# Patient Record
(demographics unavailable — no encounter records)

---

## 2025-01-07 NOTE — REASON FOR VISIT
[Initial Consultation] : an initial consultation [Parents] : parents [Undescended testicle] : undescended testicle

## 2025-01-18 NOTE — CONSULT LETTER
[FreeTextEntry1] : Dear Dr. JEEVAN BANGURA,      I had the pleasure of consulting on TUAN MAO today.  Below is my note regarding the office visit today.      Thank you so very much for allowing me to participate in YAYAs care.  Please don't hesitate to call me should any questions or issues arise.        Sincerely,     Yasmani Bolanos MD, FACS, [facility]    Chief, Pediatric Urology    Professor of Urology and Pediatrics    [facility] [employer] [facility] [employer]        President, American Urological Association - [place] Section    Past-President, Societies for Pediatric Urology

## 2025-01-18 NOTE — HISTORY OF PRESENT ILLNESS
[TextBox_4] : TUAN presents today for an initial consultation.  [employer]  ID# [id number] (Mandarin) utilized as per mother request.  He is a 16 year old who was noted recently to have bilateral undescended testicles. This was NOT noted at birth. The testis has NOT been descending with time. No history of trauma or infection or inflammation. No pain or swelling on either side.  Of note, patient resides at [place], complex medical history, G-tube dependent.

## 2025-01-18 NOTE — PHYSICAL EXAM
[TextBox_92] : PENIS: Straight penis. Meatus orthotopic SCROTUM: Right testicle located in the external ring, left testicle located high in the scrotum; bilateral symmetric testes without palpable mass, hernia or hydrocele

## 2025-01-18 NOTE — CONSULT LETTER
[FreeTextEntry1] : Dear Dr. JEEVAN BANGURA,      I had the pleasure of consulting on TUAN MAO today.  Below is my note regarding the office visit today.      Thank you so very much for allowing me to participate in YAYAs care.  Please don't hesitate to call me should any questions or issues arise.        Sincerely,     Yasmani Bolanos MD, FACS, [facility]    Chief, Pediatric Urology    Professor of Urology and Pediatrics    [facility] [employer]        President, American Urological Association - [place] Section    Past-President, Societies for Pediatric Urology

## 2025-01-18 NOTE — HISTORY OF PRESENT ILLNESS
[TextBox_4] : TUAN presents today for an initial consultation.  [employer]  ID# [id number] (Mandarin) utilized as per mother request.  He is a 16 year old who was noted recently to have bilateral undescended testicles. This was NOT noted at birth. The testis has NOT been descending with time. No history of trauma or infection or inflammation. No pain or swelling on either side.  Of note, patient resides at [place], complex medical history, G-tube dependent.

## 2025-01-18 NOTE — ASSESSMENT
[FreeTextEntry1] : TUAN has an ascended RIGHT testis and an undescended LEFT testis. This occurs in the minority of boys with retractile testes. They do not descend and therefore require surgery to bring the testis down. I explained the general principles of the surgery using drawings, the anticipated postoperative course and discussed the possible risks which include but are not limited to infection, bleeding, testis atrophy or loss, hydrocele formation, incomplete positioning of the testis. All questions were answered.

## 2025-01-18 NOTE — HISTORY OF PRESENT ILLNESS
[TextBox_4] : TUAN presents today for an initial consultation.  [employer]  ID# [id number] (Mandarin) utilized as per mother request.  He is a 16 year old who was noted recently to have bilateral undescended testicles. This was NOT noted at birth. The testis has NOT been descending with time. No history of trauma or infection or inflammation. No pain or swelling on either side.  Of note, patient resides at [place], complex medical history, G-tube dependent.

## 2025-01-18 NOTE — CONSULT LETTER
[FreeTextEntry1] : Dear Dr. JEEVAN BANGURA,      I had the pleasure of consulting on TUAN MAO today.  Below is my note regarding the office visit today.      Thank you so very much for allowing me to participate in YAYAs care.  Please don't hesitate to call me should any questions or issues arise.        Sincerely,     Yasmani Bolanos MD, FACS, [facility]    Chief, Pediatric Urology    Professor of Urology and Pediatrics    [facility] [employer] [facility] [employer]        President, American Urological Association - [place] Section    Past-President, Societies for Pediatric Urology

## 2025-02-05 NOTE — HISTORY OF PRESENT ILLNESS
[FreeTextEntry1] : Martin is a 16 year male who is brought in today by a nurse from [place], where he resides, mother available on phone (mandarin speaking, nurse able to translate) for evaluation of scoliosis and right hip. 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 Mother feels with certain positions his back appears to cause his discomfort. Mother does not feel his hip appears to cause him any discomfort. Nurse from [place] denies that he appears to have any pain or discomfort. He presents today for orthopedic evaluation.

## 2025-02-05 NOTE — HISTORY OF PRESENT ILLNESS
[FreeTextEntry1] : Martin is a 16 year male who is brought in today by a nurse from [place], where he resides, mother available on phone (mandarin speaking, nurse able to translate) for evaluation of scoliosis and right hip. 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 Mother feels with certain positions his back appears to cause his discomfort. Mother does not feel his hip appears to cause him any discomfort. Nurse from [place] denies that he appears to have any pain or discomfort. He presents today for orthopedic evaluation.

## 2025-02-05 NOTE — ASSESSMENT
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   Today's visit included obtaining history from the child and parent due to the child's age, the child could not be considered a reliable historian, requiring [facility] to act as independent historian.   Natural history of spine deformity discussed. Clinical findings and imaging was discussed with the patient and family at length. X-rays performed and reviewed in office today revealing a 90 degree curve. It was discussed that for scoliosis, curves less than 25 degrees are usually managed with observation. Bracing is warranted for curves measuring greater than 25 degrees with skeletal growth remaining.  Braces do not correct curves permanently and there is a 30% risk brace failure. Surgery is recommended for scoliosis measuring greater than 45 degrees. Today curve measures 90 degrees. Surgery was discussed with Brusly's nurse and mother. We discussed that this a large curve. Risk and benefits of surgery was discussed. Given multiple medical comorbidities he would require optimization and preoperative evaluation by GI, pulmonary, and cardiology to minimize the risk of post operative complications. Possible post operative complications was discussed at length. Mother will present in person to next office visit for further discussion regarding moving forward with posterior spinal fusion. We also discussed that if his right hip does not appear to cause him any pain or discomfort, observation is recommended. Follow up recommended in 4 months for clinical reassessment and repeat XRs, sooner if mother is ready to proceed with surgical planning.    Risk of progression explained. Spine deformity can cause back pain later on and also arthritis, though usually later. Spine deformity can affect organ systems,such as lungs, less commonly heart and GI etc over time depending on curve size and progression. Deformity can progress with growth but can continue to progress later on based on the size of the curve. It can also effect patient's height due to the curve. It usually does not impact activities and has no limitations, however activities may be limited due to pain or rarely breathlessness with large curves. Scoliosis is usually not impacted by daily activities- sleeping position, sitting position, lifting heavy weights etc, however posture and back pain can be affected by some of these. Stretching, exercises, bone health and nutrition are important factors in the long run. The importance of core and back strengthening was discussed. Spine deformity may have genetics etiology and so siblings and progenies should be evaluated.   All questions and concerns were addressed today. Family verbalizes understanding and agree with plan of care.   We spent 45 minutes on HPI, Clinical exam, ordering/ reviewing all imaging, reviewing any existing record, reviewing findings and counseling patient to treatment, differentials, etiology, prognosis, natural history, implications on ADLs, activities limitations/modifications, answering questions and addressing concerns, treatment goals and documenting in the EHR.

## 2025-02-05 NOTE — PHYSICAL EXAM
[FreeTextEntry1] : Gait: Non ambulatory, presents in stretcher  GENERAL: appears in no apparent distress  SKIN: The skin is intact, warm, pink and dry over the area examined. CARDIOVASCULAR: brisk capillary refill, but no peripheral edema. RESPIRATORY: The patient is in no apparent respiratory distress. They're taking full deep breaths without use of accessory muscles or evidence of audible wheezes or stridor without the use of a stethoscope. Normal respiratory effort. ABDOMEN: not examined MSK: Significant joint contractures of the upper and lower extremities, patient is windswept to the left. No apparent discomfort with gentle ROM of the hips  Spine Significant left thoracolumbar curve  Motor and sensory exam limited due to underlying diagnosis

## 2025-02-05 NOTE — DATA REVIEWED
[de-identified] : Supine Scoliosis X-ray performed today independently reviewed demonstrates 90 degree left thorolumbar curve. No hemivertebrae or congenital deformity noted. The disc spaces are equal throughout spine. R hip is subluxed. Risser is difficult to assess based on todays x-rays

## 2025-02-05 NOTE — REASON FOR VISIT
[Initial Evaluation] : an initial evaluation [Patient] : patient [Mother] : mother [Other: _____] : [unfilled] [FreeTextEntry1] : Scoliosis evaluation

## 2025-02-05 NOTE — REVIEW OF SYSTEMS
[Appropriate Age Development] : development appropriate for age [Change in Activity] : no change in activity [Fever Above 102] : no fever [Itching] : no itching [Redness] : no redness [Sore Throat] : no sore throat [Murmur] : no murmur [Wheezing] : no wheezing [Asthma] : no asthma

## 2025-02-05 NOTE — DATA REVIEWED
[de-identified] : Supine Scoliosis X-ray performed today independently reviewed demonstrates 90 degree left thorolumbar curve. No hemivertebrae or congenital deformity noted. The disc spaces are equal throughout spine. R hip is subluxed. Risser is difficult to assess based on todays x-rays

## 2025-02-05 NOTE — ASSESSMENT
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   Today's visit included obtaining history from the child and parent due to the child's age, the child could not be considered a reliable historian, requiring [facility] to act as independent historian.   Natural history of spine deformity discussed. Clinical findings and imaging was discussed with the patient and family at length. X-rays performed and reviewed in office today revealing a 90 degree curve. It was discussed that for scoliosis, curves less than 25 degrees are usually managed with observation. Bracing is warranted for curves measuring greater than 25 degrees with skeletal growth remaining.  Braces do not correct curves permanently and there is a 30% risk brace failure. Surgery is recommended for scoliosis measuring greater than 45 degrees. Today curve measures 90 degrees. Surgery was discussed with Jennette's nurse and mother. We discussed that this a large curve. Risk and benefits of surgery was discussed. Given multiple medical comorbidities he would require optimization and preoperative evaluation by GI, pulmonary, and cardiology to minimize the risk of post operative complications. Possible post operative complications was discussed at length. Mother will present in person to next office visit for further discussion regarding moving forward with posterior spinal fusion. We also discussed that if his right hip does not appear to cause him any pain or discomfort, observation is recommended. Follow up recommended in 4 months for clinical reassessment and repeat XRs, sooner if mother is ready to proceed with surgical planning.    Risk of progression explained. Spine deformity can cause back pain later on and also arthritis, though usually later. Spine deformity can affect organ systems,such as lungs, less commonly heart and GI etc over time depending on curve size and progression. Deformity can progress with growth but can continue to progress later on based on the size of the curve. It can also effect patient's height due to the curve. It usually does not impact activities and has no limitations, however activities may be limited due to pain or rarely breathlessness with large curves. Scoliosis is usually not impacted by daily activities- sleeping position, sitting position, lifting heavy weights etc, however posture and back pain can be affected by some of these. Stretching, exercises, bone health and nutrition are important factors in the long run. The importance of core and back strengthening was discussed. Spine deformity may have genetics etiology and so siblings and progenies should be evaluated.   All questions and concerns were addressed today. Family verbalizes understanding and agree with plan of care.   We spent 45 minutes on HPI, Clinical exam, ordering/ reviewing all imaging, reviewing any existing record, reviewing findings and counseling patient to treatment, differentials, etiology, prognosis, natural history, implications on ADLs, activities limitations/modifications, answering questions and addressing concerns, treatment goals and documenting in the EHR.

## 2025-03-02 NOTE — PHYSICAL EXAM
[NL] : grossly intact [FreeTextEntry2] : Left posterior neck rounded, raised cystic lesion, central punctate opening, no fluctuance, no drainage, no induration [TextBox_37] : Right flank abscess with associated tenderness, I&D performed in office  [TextBox_86] : right posterior flank abscess, ~1.5cm, rounded, central fluctuance, apparent tenderness to palpation, no surrounding erythema or induration

## 2025-03-02 NOTE — REASON FOR VISIT
[Initial - Scheduled] : an initial, scheduled visit with concerns of [Abscess] : abscess [Mother] : mother [Other: _____] : [unfilled] [Foster Parents/Guardian] : /guardian [FreeTextEntry4] : Dr Pillo Palacio [Interpreters_IDNumber] : [id number] [Interpreters_FullName] : Dominick [TWNoteComboBox1] : Chinese

## 2025-03-02 NOTE — CONSULT LETTER
[Dear  ___] : Dear  [unfilled], [Consult Letter:] : I had the pleasure of evaluating your patient, [unfilled]. [Please see my note below.] : Please see my note below. [Consult Closing:] : Thank you very much for allowing me to participate in the care of this patient.  If you have any questions, please do not hesitate to contact me. [Sincerely,] : Sincerely, [FreeTextEntry3] : Sukhjinder Maciel MD Division of Pediatric, General, Thoracic and Endoscopic Surgery [facility]        [FreeTextEntry2] : Pillo Palacio MD [address] [place] [address] Phone: [phone]

## 2025-03-02 NOTE — ASSESSMENT
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 He currently residents at [facility] but per mom he has been having ongoing similar findings in the last year.  I spoke with mom and explained to her that the left neck lesion does not appear to be actively infected at this time and therefore I do not recommend any intervention at this time. However, there is a right flank abscess with associated flutuance and tenderness. I reviewed treatment options with mom on the phone using Mandarin , and I recommended incision and drainage of the abscess in the office today. I reviewed the indications, risks, benefits and alternatives of the procedure with mom. The risks discussed included but were not limited to bleeding, infection, injury to adjacent structures and recurrent abscess requiring another I&D. I reviewed post-procedural expectations. Mom verbalized understanding and gave consent to proceed.  A time out was performed. Emla cream was placed on the region. The area was prepped in sterile fashion.  An incision was made in the central most fluctuant aspect of the abscess. The cavity was probed into and purulent fluid as well as small fragmented cystic contents was expressed.  The area was irrigated and a sterile dressing was placed on the region. Martin tolerated the procedure well. I have recommended 5 day course of antibiotics to be given at [facility].  I have also recommended a dermatology consult given mom's concerns this has been an ongoing concern.  Martin can return to see me on an as needed basis.

## 2025-03-02 NOTE — ADDENDUM
[FreeTextEntry1] : Documented by Antoine Bai acting as a scribe for Dr. Maciel on 02/26/2025.   All medical record entries made by the Scribe were at my, Dr. Maciel, direction and personally dictated by me on 02/26/2025. I have reviewed the chart and agree that the record accurately reflects my personal performances of the history, physical exam, assessment and plan. I have also personally directed, reviewed, and agree with the instructions.

## 2025-03-02 NOTE — ASSESSMENT
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 He currently residents at [facility] but per mom he has been having ongoing similar findings in the last year.  I spoke with mom and explained to her that the left neck lesion does not appear to be actively infected at this time and therefore I do not recommend any intervention at this time. However, there is a right flank abscess with associated flutuance and tenderness. I reviewed treatment options with mom on the phone using Mandarin , and I recommended incision and drainage of the abscess in the office today. I reviewed the indications, risks, benefits and alternatives of the procedure with mom. The risks discussed included but were not limited to bleeding, infection, injury to adjacent structures and recurrent abscess requiring another I&D. I reviewed post-procedural expectations. Mom verbalized understanding and gave consent to proceed.  A time out was performed. Emla cream was placed on the region. The area was prepped in sterile fashion.  An incision was made in the central most fluctuant aspect of the abscess. The cavity was probed into and purulent fluid as well as small fragmented cystic contents was expressed.  The area was irrigated and a sterile dressing was placed on the region. Martin tolerated the procedure well. I have recommended 5 day course of antibiotics to be given at [facility].  I have also recommended a dermatology consult given mom's concerns this has been an ongoing concern.  Martin can return to see me on an as needed basis.

## 2025-03-02 NOTE — HISTORY OF PRESENT ILLNESS
[FreeTextEntry1] : Martin is a 16 year old boy with a complex medical history including encephalopathy, spastic quadraplegia, CP and epilepsy here today for surgical evaluation of a right flank abscess and left posterior neck cystic lesion.  He currently resides at [facility] and was sent in for evaluation of these two sites.  Per mom, this has been an ongoing issue and he continues to have skin lesions that seem to act up in the last year.  He has not had any fevers.

## 2025-03-02 NOTE — REASON FOR VISIT
[Initial - Scheduled] : an initial, scheduled visit with concerns of [Abscess] : abscess [Mother] : mother [Other: _____] : [unfilled] [Foster Parents/Guardian] : /guardian [FreeTextEntry4] : Dr Pillo Palacio [Interpreters_IDNumber] : [id number] [Interpreters_FullName] : Dominick [TWNoteComboBox1] : Chinese

## 2025-03-02 NOTE — HISTORY OF PRESENT ILLNESS
[FreeTextEntry1] : Martin is a 16 year old boy with a complex medical history including encephalopathy, spastic quadraplegia, CP and epilepsy here today for surgical evaluation of a right flank abscess and left posterior neck cystic lesion.  He currently resides at [facility] and was sent in for evaluation of these two sites.  Per mom, this has been an ongoing issue and he continues to have skin lesions that seem to act up in the last year.  He has not had any fevers.

## 2025-03-02 NOTE — CONSULT LETTER
[Dear  ___] : Dear  [unfilled], [Consult Letter:] : I had the pleasure of evaluating your patient, [unfilled]. [Please see my note below.] : Please see my note below. [Consult Closing:] : Thank you very much for allowing me to participate in the care of this patient.  If you have any questions, please do not hesitate to contact me. [Sincerely,] : Sincerely, [FreeTextEntry3] : Sukhjinder Maciel MD Division of Pediatric, General, Thoracic and Endoscopic Surgery [facility]        [FreeTextEntry2] : Pillo Palacio MD [address] [place] [address] Phone: [phone]

## 2025-03-06 NOTE — DATA REVIEWED
[de-identified] : No new imaging obtained during today's visit.   02/03/2025: Supine Scoliosis X-ray performed today independently reviewed demonstrates 90 degree left thorolumbar curve. No hemivertebrae or congenital deformity noted. The disc spaces are equal throughout spine. R hip is subluxed. Risser is difficult to assess based on todays x-rays

## 2025-03-06 NOTE — ASSESSMENT
[FreeTextEntry1] : 16-year-old male with neuromuscular scoliosis, curve measuring 90 degrees and subluxed right hip  Today's visit included obtaining history from the child and parent due to the child's age, the child could not be considered a reliable historian, requiring mother and [place]'s rep to act as independent historian. Cody Herrrea assisted with Mandarin interpretation for the entirety of the visit. Natural history of spine deformity discussed. Clinical findings and imaging was discussed with the patient and family at length. X-rays performed and reviewed in office today revealing a 90-degree curve. It was discussed that for scoliosis, curves less than 25 degrees are usually managed with observation. Bracing is warranted for curves measuring greater than 25 degrees with skeletal growth remaining.  Braces do not correct curves permanently and there is a 30% risk brace failure. Surgery is recommended for scoliosis measuring greater than 45 degrees. Today curve measures 90 degrees. Surgery was discussed with [place]'s nurse and mother. We discussed that this a large curve. Risk and benefits of surgery was discussed. Scoliosis is at risk of progression despite skeletal maturity due to its magnitude. We discussed that this a large curve. Risk and benefits of surgery was discussed. Given multiple medical comorbidities he would require optimization and preoperative evaluation by GI, neurology, pulmonary, and cardiology to minimize the risk of post operative complications. Possible post operative complications was discussed at length.  All risks, benefits, and alternatives were discussed in the clinic for a posterior spinal fusion with instrumentation procedure. Preoperative and postoperative instructions were reviewed with family. We will begin preoperative planning including pulmonary, neurology, cardiology, and GI appointments, as well as MRI of patient's spine for further evaluation. Mother is interested in proceeding with surgery as patient is having breathing and sleeping difficulties. Our  will contact family to begin scheduling their preoperative testing. We also discussed that if his right hip does not appear to cause him any pain or discomfort, observation is recommended. Activities as tolerated.  All questions answered, understanding verbalized. Parent and patient in agreement with plan of care. At follow up, patient to obtain scoliosis preoperative bending films for preoperative visit.   Natural history of spine deformity discussed. Risk of progression explained. Spine deformity can cause back pain later on and also arthritis, though usually later. Spine deformity can affect organ systems, such as lungs, less commonly heart and GI etc over time depending on curve size and progression. Deformity can progress with growth but can continue to progress later on based on the size of the curve. It can also effect patient's height due to the curve..It usually does not impact activities and has no limitations, however activities may be limited due to pain or rarely breathlessness with large curves. Scoliosis is usually not impacted by daily activities- sleeping position, sitting position, lifting heavy weights etc, however posture and back pain can be affected by some of these.Stretching, exercises, bone health and nutrition are important factors in the long run. Spine deformity may have genetics etiology and so siblings and progenies should be evaluated.For scoliosis, curves less than 25 degrees are usually managed with observation. Bracing is warranted for curves measuring greater than 25 degrees with skeletal growth remaining. Braces do not correct curves permanently and there is a 30% risk brace failure. Surgery is recommended for scoliosis measuring greater than 45 degrees.   Amarjit ARVIZU, have acted as a scribe and documented the above information for Dr. Adams on 03/03/2025.   We spent 30 minutes on HPI, Clinical exam, ordering/ reviewing all imaging, reviewing any existing record, reviewing findings and counseling patient to treatment, differentials,etiology, prognosis, natural history, implications on ADLs, activities limitations/modifications, genetics, answering questions and addressing concerns, treatment goals and documenting in the EHR.

## 2025-03-06 NOTE — ASSESSMENT
[FreeTextEntry1] : 16-year-old male with neuromuscular scoliosis, curve measuring 90 degrees and subluxed right hip  Today's visit included obtaining history from the child and parent due to the child's age, the child could not be considered a reliable historian, requiring mother and [place]'s rep to act as independent historian. Cody Herrera assisted with Mandarin interpretation for the entirety of the visit. Natural history of spine deformity discussed. Clinical findings and imaging was discussed with the patient and family at length. X-rays performed and reviewed in office today revealing a 90-degree curve. It was discussed that for scoliosis, curves less than 25 degrees are usually managed with observation. Bracing is warranted for curves measuring greater than 25 degrees with skeletal growth remaining.  Braces do not correct curves permanently and there is a 30% risk brace failure. Surgery is recommended for scoliosis measuring greater than 45 degrees. Today curve measures 90 degrees. Surgery was discussed with [place]'s nurse and mother. We discussed that this a large curve. Risk and benefits of surgery was discussed. Scoliosis is at risk of progression despite skeletal maturity due to its magnitude. We discussed that this a large curve. Risk and benefits of surgery was discussed. Given multiple medical comorbidities he would require optimization and preoperative evaluation by GI, neurology, pulmonary, and cardiology to minimize the risk of post operative complications. Possible post operative complications was discussed at length.  All risks, benefits, and alternatives were discussed in the clinic for a posterior spinal fusion with instrumentation procedure. Preoperative and postoperative instructions were reviewed with family. We will begin preoperative planning including pulmonary, neurology, cardiology, and GI appointments, as well as MRI of patient's spine for further evaluation. Mother is interested in proceeding with surgery as patient is having breathing and sleeping difficulties. Our  will contact family to begin scheduling their preoperative testing. We also discussed that if his right hip does not appear to cause him any pain or discomfort, observation is recommended. Activities as tolerated.  All questions answered, understanding verbalized. Parent and patient in agreement with plan of care. At follow up, patient to obtain scoliosis preoperative bending films for preoperative visit.   Natural history of spine deformity discussed. Risk of progression explained. Spine deformity can cause back pain later on and also arthritis, though usually later. Spine deformity can affect organ systems, such as lungs, less commonly heart and GI etc over time depending on curve size and progression. Deformity can progress with growth but can continue to progress later on based on the size of the curve. It can also effect patient's height due to the curve..It usually does not impact activities and has no limitations, however activities may be limited due to pain or rarely breathlessness with large curves. Scoliosis is usually not impacted by daily activities- sleeping position, sitting position, lifting heavy weights etc, however posture and back pain can be affected by some of these.Stretching, exercises, bone health and nutrition are important factors in the long run. Spine deformity may have genetics etiology and so siblings and progenies should be evaluated.For scoliosis, curves less than 25 degrees are usually managed with observation. Bracing is warranted for curves measuring greater than 25 degrees with skeletal growth remaining. Braces do not correct curves permanently and there is a 30% risk brace failure. Surgery is recommended for scoliosis measuring greater than 45 degrees.   Amarjit ARVIZU, have acted as a scribe and documented the above information for Dr. Adams on 03/03/2025.   We spent 30 minutes on HPI, Clinical exam, ordering/ reviewing all imaging, reviewing any existing record, reviewing findings and counseling patient to treatment, differentials,etiology, prognosis, natural history, implications on ADLs, activities limitations/modifications, genetics, answering questions and addressing concerns, treatment goals and documenting in the EHR.

## 2025-03-06 NOTE — HISTORY OF PRESENT ILLNESS
[FreeTextEntry1] : Martin is a 16-year-old male who is brought in today by mother and nurse from [place], where he resides, for follow up regarding scoliosis and right hip. 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 Patient presents today in a stretcher. Initially seen 02/03/2025, surgery was briefly discussed for 90-degree scoliosis. Mother is here today to discuss posterior spinal fusion in length. Per nurse, patient is requiring 2 L of oxygen as a result of his difficult breathing difficulties. Mother reports child has both breathing and sleeping difficulties. Mother feels with certain positions his back appears to cause his discomfort. Mother does not feel his hip appears to cause him any discomfort.  Nurse from [place] denies that he appears to have any pain or discomfort. Mother also reports child is having constipation issues. Mother is hesitant to undergo any Botox injections at this time. Patient last received Botox injection in January 2025. Here today to discuss surgery further.   JIA Herrera assisted with Mandarin interpretation.

## 2025-03-06 NOTE — HISTORY OF PRESENT ILLNESS
[FreeTextEntry1] : Martin is a 16-year-old male who is brought in today by mother and nurse from [place], where he resides, for follow up regarding scoliosis and right hip. 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 Patient presents today in a stretcher. Initially seen 02/03/2025, surgery was briefly discussed for 90-degree scoliosis. Mother is here today to discuss posterior spinal fusion in length. Per nurse, patient is requiring 2 L of oxygen as a result of his difficult breathing difficulties. Mother reports child has both breathing and sleeping difficulties. Mother feels with certain positions his back appears to cause his discomfort. Mother does not feel his hip appears to cause him any discomfort.  Nurse from [place] denies that he appears to have any pain or discomfort. Mother also reports child is having constipation issues. Mother is hesitant to undergo any Botox injections at this time. Patient last received Botox injection in January 2025. Here today to discuss surgery further.   JIA Herrera assisted with Mandarin interpretation.

## 2025-03-06 NOTE — REASON FOR VISIT
[Follow Up] : a follow up visit [Patient] : patient [Mother] : mother [Other: _____] : [unfilled] [FreeTextEntry1] : Scoliosis

## 2025-03-06 NOTE — DATA REVIEWED
[de-identified] : No new imaging obtained during today's visit.   02/03/2025: Supine Scoliosis X-ray performed today independently reviewed demonstrates 90 degree left thorolumbar curve. No hemivertebrae or congenital deformity noted. The disc spaces are equal throughout spine. R hip is subluxed. Risser is difficult to assess based on todays x-rays

## 2025-06-05 NOTE — PROCEDURE
[FreeTextEntry3] : BILATERAL ORCHIDOPEXY:   RIGHT (INGUINAL APPROACH)  LEFT (SCROTAL APPROACH) [FreeTextEntry5] : NONE [FreeTextEntry6] : BATHE IN 3 DAYS - SPONGE BATHS UNTIL THEN FEEDS AS USUAL FOLLOW UP 3 WEEKS

## 2025-06-05 NOTE — CONSULT LETTER
[FreeTextEntry1] :   Dear Dr. JEEVAN BANGURA,   Our mutual patient, TUAN MAO underwent surgery today as outlined below. The procedure went well and he was discharged from the PACU after an uneventful stay. Discharge instructions were provided in writing. Instructions regarding follow up were also provided.   Sincerely,   Yasmani Bolanos MD, FACS, FSPU Chief, Pediatric Urology Professor of Urology and Pediatrics [employer] [facility] [employer] at [facility].   President-elect, American Urological Association

## 2025-06-05 NOTE — CONSULT LETTER
[FreeTextEntry1] :   Dear Dr. JEEVAN BANGURA,   Our mutual patient, TUAN MAO underwent surgery today as outlined below. The procedure went well and he was discharged from the PACU after an uneventful stay. Discharge instructions were provided in writing. Instructions regarding follow up were also provided.   Sincerely,   Yasmani Bolanos MD, FACS, FSPU Chief, Pediatric Urology Professor of Urology and Pediatrics [facility] [employer] at [facility].   President-elect, American Urological Association

## 2025-07-03 NOTE — ASSESSMENT
[FreeTextEntry1] : 17yr old male here to receive medical clearance for his upcoming surgery for spinal fusion. He now lives at [facility] and is gastrostomy tube dependent. He is tolerating his feeds well. There are no complaints of reflux symptoms. He is stooling appropriately. His weight is stable. At this time, I have provided documentation that he is medical cleared for his surgery from a gastrointestinal perspective and encouraged him to be seen by other doctors for clearance for his surgery. I have recommended for Martin to follow up in 3-4months to reassess his clinical status and weight trend.

## 2025-07-03 NOTE — HISTORY OF PRESENT ILLNESS
[de-identified] : Martin is a 17 year old male here for further evaluation and treatment. He resides at [facility]. He was previously at [place] and was transferred in 9/2024. He is scheduled to have spinal surgery on 8/2 and is here for medical clearance for the procedure. Mom reports he was taking a shower in 2016 and suffered from anoxic brain injury and is now neurologically devastated. He is nonverbal, wheelchair bound and is G tube dependent. He has supplemental oxygen via nasal cannula. Mom reports prior to this incident, he was previously healthy.  He is accompanied by his mom and nurse at today's visit.  For his respiratory status, he performs chest clearance using a vest and suction as needed.  He does not take anything by mouth. He is G tube dependent which was placed 1 week after he was hospitalized in 2016.  He is receiving nestle compleat 280ml every 4 hours 1.0 (83% free water of this formula).  He receives free water 240ml after every feed  maintenance fluids based on his weight 2040ml Patient is receiving 1889ml per day, 25kcal/kg/day  There are no recent changes with his feeding schedule The doctor from [facility] has been handling his gtube feeds.   His weight has been stable. He maintains his weight. His last weight was 45kg at [facility] in June. The scale in the office at today's visit is broken.  He is tolerating his feeds well, there are no complaints of feeding intolerance or reflux symptoms. Mom reports he will only have reflux symptoms when sick. When he has reflux symptoms, mom reports the staff will provide a decreased amount of his feed or delay feedings which improves his symptoms.    There are no complaints of abdominal distension or discomfort with feeds. He is stooling once daily voiding well. His stools are soft in consistency. Mom reports the staff will administer red color juice and green bean soup to help him stool.   He suffered from a cold on June 20th and received antibiotics and cough medication. On 6/30, a CXR was performed which revealed no findings of PNA.  jaime button: 14Fr 3.0 cm skin good with no signs of redness, drainage. Mom reports cleaning the stoma site regularly.  PMH: found unresponsive in his tub in 2016, was taken to the hospital for further evaluation and was diagnosed with static encephalopathy, seizure disorder, G tube dependent, spastic quadriplegia, scoliosis PSH: G tube placement Medications: omeprazole 20mg BID, tums, vitamin D3, miralax 1capful twice daily, senna 8.6mg once daily, bisacodyl 10mg per rectum x1, fleet enema x1, glycerin supp x1 clonidine, dantrolene, diazepam, fish oil, flovent, glycopyrrolate, guaifenesin, levetiracetam, propranolol Allergy: none Family history: noncontributory Social: Patient lives at [facility]  Quinolones Counseling:  I discussed with the patient the risks of fluoroquinolones including but not limited to GI upset, allergic reaction, drug rash, diarrhea, dizziness, photosensitivity, yeast infections, liver function test abnormalities, tendonitis/tendon rupture.

## 2025-07-03 NOTE — ASSESSMENT
[FreeTextEntry1] : Martin has done well post-operatively his bilateral orchiopexy with right inguinal and left scrotal approach. Physical exam today demonstrated a well healed right inguinal incision, a well healed left scrotal incision and bilateral testes palpable in the dependent position with s/s of infection. Mother and I are quite satisfied with the outcome. Recommended follow up in 6-months for reexam. Mother expressed understanding and all questions were answered.

## 2025-07-03 NOTE — REASON FOR VISIT
[Follow-Up Visit] : a follow-up visit [PCP] : ~pcp~ [Mother] : mother [Other: _____] : [unfilled] [TextBox_50] : post-operative follow up [Interpreters_IDNumber] : [id number] [Interpreters_FullName] : Gabrielle Mcdaniels [FreeTextEntry3] : Mandarin [TWNoteComboBox1] : Other

## 2025-07-03 NOTE — PHYSICAL EXAM
[Well Developed] : well developed [Well Nourished] : well nourished [NAD] : in no acute distress [PERRL] : pupils were equal, round, reactive to light  [icteric] : anicteric [Moist & Pink Mucous Membranes] : moist and pink mucous membranes [CTAB] : lungs clear to auscultation bilaterally [Respiratory Distress] : no respiratory distress  [Regular Rate and Rhythm] : regular rate and rhythm [Normal S1, S2] : normal S1 and S2 [Soft] : soft  [Distended] : non distended [Tender] : non tender [Normal Bowel Sounds] : normal bowel sounds [Feeding Tube] : There was a feeding tube  [___F] : [unfilled] F [___cm] : [unfilled] cm [Clean] : clean [Dry] : dry [Erythema] : no erythema [Granulation Tissue] : no granulation tissue [Tube Rotates Easily] : tube rotates easily [No HSM] : no hepatosplenomegaly appreciated [Verbal] : non verbal [Well-Perfused] : well-perfused [Edema] : no edema [Cyanosis] : no cyanosis [Rash] : no rash [Jaundice] : no jaundice [Interactive] : interactive [FreeTextEntry1] : lying on stretcher [FreeTextEntry3] : nasal cannula in place [de-identified] : sciolosis [de-identified] : nonverbal, smiles

## 2025-07-03 NOTE — REASON FOR VISIT
[Follow-Up Visit] : a follow-up visit [PCP] : ~pcp~ [Mother] : mother [Other: _____] : [unfilled] [TextBox_50] : post-operative follow up [Interpreters_IDNumber] : [id number] [Interpreters_FullName] : Gabrielle Mcdaniels [FreeTextEntry3] : Mandarin [TWNoteComboBox1] : Other

## 2025-07-03 NOTE — PHYSICAL EXAM
[Well Developed] : well developed [Well Nourished] : well nourished [NAD] : in no acute distress [PERRL] : pupils were equal, round, reactive to light  [icteric] : anicteric [Moist & Pink Mucous Membranes] : moist and pink mucous membranes [CTAB] : lungs clear to auscultation bilaterally [Respiratory Distress] : no respiratory distress  [Regular Rate and Rhythm] : regular rate and rhythm [Normal S1, S2] : normal S1 and S2 [Soft] : soft  [Distended] : non distended [Tender] : non tender [Normal Bowel Sounds] : normal bowel sounds [Feeding Tube] : There was a feeding tube  [___F] : [unfilled] F [___cm] : [unfilled] cm [Clean] : clean [Dry] : dry [Erythema] : no erythema [Granulation Tissue] : no granulation tissue [Tube Rotates Easily] : tube rotates easily [No HSM] : no hepatosplenomegaly appreciated [Verbal] : non verbal [Well-Perfused] : well-perfused [Edema] : no edema [Cyanosis] : no cyanosis [Rash] : no rash [Jaundice] : no jaundice [Interactive] : interactive [FreeTextEntry1] : lying on stretcher [FreeTextEntry3] : nasal cannula in place [de-identified] : sciolosis [de-identified] : nonverbal, smiles

## 2025-07-03 NOTE — CONSULT LETTER
[FreeTextEntry1] : Dear Dr. Palacio,   I had the pleasure of seeing Martin for follow up today. Below is my note regarding the office visit today.    Thank you so very much for allowing me to participate in Martin's care. Please don't hesitate to call me should any questions or issues arise .    Sincerely,    Yasmani Bolanos MD, FACS, \[facility]\""U    Chief, Pediatric Urology    Professor of Urology and Pediatrics    [employer]    President, American Urological Association - [place] Section    Past-President, Societies for Pediatric Urology

## 2025-07-03 NOTE — REVIEW OF SYSTEMS
[Negative] : Skin [FreeTextEntry6] : negative except HPI [FreeTextEntry9] : negative except HPI [de-identified] : negative except HPI [de-identified] : negative except HPI

## 2025-07-03 NOTE — HISTORY OF PRESENT ILLNESS
[de-identified] : Martin is a 17 year old male here for further evaluation and treatment. He resides at [facility]. He was previously at [place] and was transferred in 9/2024. He is scheduled to have spinal surgery on 8/2 and is here for medical clearance for the procedure. Mom reports he was taking a shower in 2016 and suffered from anoxic brain injury and is now neurologically devastated. He is nonverbal, wheelchair bound and is G tube dependent. He has supplemental oxygen via nasal cannula. Mom reports prior to this incident, he was previously healthy.  He is accompanied by his mom and nurse at today's visit.  For his respiratory status, he performs chest clearance using a vest and suction as needed.  He does not take anything by mouth. He is G tube dependent which was placed 1 week after he was hospitalized in 2016.  He is receiving nestle compleat 280ml every 4 hours 1.0 (83% free water of this formula).  He receives free water 240ml after every feed  maintenance fluids based on his weight 2040ml Patient is receiving 1889ml per day, 25kcal/kg/day  There are no recent changes with his feeding schedule The doctor from [facility] has been handling his gtube feeds.   His weight has been stable. He maintains his weight. His last weight was 45kg at [facility] in June. The scale in the office at today's visit is broken.  He is tolerating his feeds well, there are no complaints of feeding intolerance or reflux symptoms. Mom reports he will only have reflux symptoms when sick. When he has reflux symptoms, mom reports the staff will provide a decreased amount of his feed or delay feedings which improves his symptoms.    There are no complaints of abdominal distension or discomfort with feeds. He is stooling once daily voiding well. His stools are soft in consistency. Mom reports the staff will administer red color juice and green bean soup to help him stool.   He suffered from a cold on June 20th and received antibiotics and cough medication. On 6/30, a CXR was performed which revealed no findings of PNA.  jaime button: 14Fr 3.0 cm skin good with no signs of redness, drainage. Mom reports cleaning the stoma site regularly.  PMH: found unresponsive in his tub in 2016, was taken to the hospital for further evaluation and was diagnosed with static encephalopathy, seizure disorder, G tube dependent, spastic quadriplegia, scoliosis PSH: G tube placement Medications: omeprazole 20mg BID, tums, vitamin D3, miralax 1capful twice daily, senna 8.6mg once daily, bisacodyl 10mg per rectum x1, fleet enema x1, glycerin supp x1 clonidine, dantrolene, diazepam, fish oil, flovent, glycopyrrolate, guaifenesin, levetiracetam, propranolol Allergy: none Family history: noncontributory Social: Patient lives at [facility]

## 2025-07-03 NOTE — HISTORY OF PRESENT ILLNESS
[TextBox_4] : Martin is a 17 y.o. male with a complex medical history being seen today for a post-operative follow up, s/p bilateral orchiopexy with right inguinal approach and left scrotal approach on 6/5/2025.He has remained afebrile post-operatively, tolerating GT feeds appropriately, voiding and stooling appropriately. Mother does not have any concerns. Patient scheduled for laminectomy this summer. No restrictions from a urological standpoint.

## 2025-07-03 NOTE — CONSULT LETTER
[FreeTextEntry1] : Dear Dr. Palacio,   I had the pleasure of seeing Martin for follow up today. Below is my note regarding the office visit today.    Thank you so very much for allowing me to participate in Martin's care. Please don't hesitate to call me should any questions or issues arise .    Sincerely,    Yasmani Bolanos MD, FACS, [facility]    Chief, Pediatric Urology    Professor of Urology and Pediatrics    [facility] [employer]    President, American Urological Association - [place] Section    Past-President, Societies for Pediatric Urology

## 2025-07-03 NOTE — ASSESSMENT
[FreeTextEntry1] : 17yr old male here to receive medical clearance for his upcoming surgery for spinal fusion. He now lives at [facility] and is gastrostomy tube dependent. He is tolerating his feeds well. There are no complaints of reflux symptoms. He is stooling appropriately. His weight is stable. At this time, I have provided documentation that he is medical cleared for his surgery from a gastrointestinal perspective and encouraged him to be seen by other doctors for clearance for his surgery. I have recommended for Martin to follow up in 3-4months to reassess his clinical status and weight trend.

## 2025-07-03 NOTE — ADDENDUM
[FreeTextEntry1] :  I have spent more than 45minutes reviewing records face to face time and charting for this patient.

## 2025-07-03 NOTE — REVIEW OF SYSTEMS
[Negative] : Skin [FreeTextEntry6] : negative except HPI [FreeTextEntry9] : negative except HPI [de-identified] : negative except HPI [de-identified] : negative except HPI

## 2025-07-03 NOTE — PHYSICAL EXAM
[TextBox_92] : Mother served as chaperone for exam.   PENIS: Uncircumcised. Distinct penoscrotal junction. Distinct penopubic junction.  No signs of infection.  TESTICLES: Bilateral testicles palpable in the dependent position of the scrotum, vertical lie, do no retract, without any masses, induration or tenderness, and approximately normal size, symmetric, and firm consistency. Left scrotal incision well healed.  SCROTAL/INGUINAL: No palpable inguinal hernias or hydroceles. Right Inguinal incision well-healed.
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
## 2025-07-13 NOTE — CONSULT LETTER
[Dear  ___] : Dear  [unfilled], [Consult Letter:] : I had the pleasure of evaluating your patient, [unfilled]. [Please see my note below.] : Please see my note below. [Consult Closing:] : Thank you very much for allowing me to participate in the care of this patient.  If you have any questions, please do not hesitate to contact me. [Sincerely,] : Sincerely, [FreeTextEntry3] : Riki Padron MD [facility]

## 2025-07-13 NOTE — PHYSICAL EXAM
[No Crackles] : no crackles [No Wheezing] : no wheezing [No Drainage] : no drainage [No Conjunctivitis] : no conjunctivitis [No Nasal Drainage] : no nasal drainage [Non-Erythematous] : non-erythematous [No Exudates] : no exudates [No Tonsillar Enlargement] : no tonsillar enlargement [No Stridor] : no stridor [Absence Of Retractions] : absence of retractions [Symmetric] : symmetric [Good Expansion] : good expansion [No Acc Muscle Use] : no accessory muscle use [Good aeration to bases] : good aeration to bases [Normal Sinus Rhythm] : normal sinus rhythm [No Heart Murmur] : no heart murmur [Soft, Non-Tender] : soft, non-tender [Non Distended] : was not ~L distended [No Clubbing] : no clubbing [Capillary Refill < 2 secs] : capillary refill less than two seconds [No Cyanosis] : no cyanosis [No Rashes] : no rashes [FreeTextEntry1] : Lying on stretcher, asleep [FreeTextEntry4] : loud snoring [FreeTextEntry3] : external exam normal [de-identified] : external exam normal [FreeTextEntry7] : frequent apneas, scattered rhonchi and referred upper airway sounds otherwise clear lungs [de-identified] : +contractures of extremities [de-identified] : hypertonic extremities, non-verbal, HIE

## 2025-07-13 NOTE — CONSULT LETTER
[Dear  ___] : Dear  [unfilled], [Consult Letter:] : I had the pleasure of evaluating your patient, [unfilled]. [Please see my note below.] : Please see my note below. [Consult Closing:] : Thank you very much for allowing me to participate in the care of this patient.  If you have any questions, please do not hesitate to contact me. [Sincerely,] : Sincerely, [FreeTextEntry3] : Riki Padron MD [facility]
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
## 2025-07-13 NOTE — PHYSICAL EXAM
[No Crackles] : no crackles [No Wheezing] : no wheezing [No Drainage] : no drainage [No Conjunctivitis] : no conjunctivitis [No Nasal Drainage] : no nasal drainage [Non-Erythematous] : non-erythematous [No Exudates] : no exudates [No Tonsillar Enlargement] : no tonsillar enlargement [No Stridor] : no stridor [Absence Of Retractions] : absence of retractions [Symmetric] : symmetric [Good Expansion] : good expansion [No Acc Muscle Use] : no accessory muscle use [Good aeration to bases] : good aeration to bases [Normal Sinus Rhythm] : normal sinus rhythm [No Heart Murmur] : no heart murmur [Soft, Non-Tender] : soft, non-tender [Non Distended] : was not ~L distended [No Clubbing] : no clubbing [Capillary Refill < 2 secs] : capillary refill less than two seconds [No Cyanosis] : no cyanosis [No Rashes] : no rashes [FreeTextEntry1] : Lying on stretcher, asleep [FreeTextEntry3] : external exam normal [FreeTextEntry4] : loud snoring [de-identified] : external exam normal [FreeTextEntry7] : frequent apneas, scattered rhonchi and referred upper airway sounds otherwise clear lungs [de-identified] : +contractures of extremities [de-identified] : hypertonic extremities, non-verbal, HIE

## 2025-07-13 NOTE — ASSESSMENT
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 Currently scheduled for surgery on 8/1. Patient resides at [facility] in [place], [place]. Additional medical history includes HIE, spastic quadriplegia, seizure disorder, hypertension, chronic respiratory failure, dysphagia, GERD, and GT dependence. He is non-verbal and non-ambulatory.  Martin has had a prior sleep study in 2023 notable for severe HUNG in addition to, reportedly, periods of wakeful apnea. He is currently supported with nocturnal BLPAP (settings in above HPI) and PRN supplemental oxygen via nasal cannula during the day. I spoke to Dr. Palacio from [place] who reports inconsistent periods of wakefulness and that often times, he is more asleep than awake. There have been prior conversations regarding tracheostomy which has been refused by family in the past. Additionally, family has been opposed to elective tracheostomy in the past. Throughout our visit, Martin was largely asleep, supported with a nasal cannula, with continued apneas and periods of gasping for air, very clearly obstructing. During my time with mom, I expressed my concerns that there is a very good chance that the ICU will have difficulties extubating Martin post-operatively and tracheostomy will need to be discussed. Mom was tearful as she feels torn between pursuit of correction of his scoliosis and the possibility for a trach. Tracheostomy aside, I expressed my concerns that Martin is currently under-supported. He likely would benefit from continuous BLPAP support at this time, especially given his inconsistent periods of wakefulness, at which time tracheostomy should be a consideration anyways.  On top of that, Martin is currently recovering from a viral illness (RVP +parainfluenza) and currently on antibiotics given concern for possible bacterial infection (?bronchitis). He is scheduled for a sedated MRI on 7/14. The timing of this procedure should be considered to provide Martin the best chance to recover well.  Will need to discuss further with [facility] team, anesthesia, and orthopedics before providing clearance.   Based on the above assessment, my recommendations are as follows: 1. Continue current BLPAP settings and recommend a nasal interface with chin strap to prevent air leak from his mouth or even a full facemask as he is monitored closely. Should be on BLPAP during all sleeping hours, not supplemental oxygen. 2. When well everyday: albuterol 2 puffs with spacer (or 2.5 mg/3 mL nebulization) followed by 3% sodium chloride via nebulization twice daily along with chest vest and cough assist device 2x daily 3. With respiratory illnesses: albuterol 2 puffs with spacer (or 2.5 mg/3 mL nebulization) followed by 3% sodium chloride via nebulization along with chest vest and cough assist device 3-4x daily, upwards of every 4 hours 4. Take Fluticasone Propionate  mcg/ACT 2 puffs twice daily with spacer 5. Will speak with [facility] team regarding optimization ahead of surgery.  Discussed above assessment and management plan. Parent agreed with plan. All queries were answered.

## 2025-07-13 NOTE — PHYSICAL EXAM
[No Crackles] : no crackles [No Wheezing] : no wheezing [No Drainage] : no drainage [No Conjunctivitis] : no conjunctivitis [No Nasal Drainage] : no nasal drainage [Non-Erythematous] : non-erythematous [No Exudates] : no exudates [No Tonsillar Enlargement] : no tonsillar enlargement [No Stridor] : no stridor [Absence Of Retractions] : absence of retractions [Symmetric] : symmetric [Good Expansion] : good expansion [No Acc Muscle Use] : no accessory muscle use [Good aeration to bases] : good aeration to bases [Normal Sinus Rhythm] : normal sinus rhythm [No Heart Murmur] : no heart murmur [Soft, Non-Tender] : soft, non-tender [Non Distended] : was not ~L distended [No Clubbing] : no clubbing [Capillary Refill < 2 secs] : capillary refill less than two seconds [No Cyanosis] : no cyanosis [No Rashes] : no rashes [FreeTextEntry1] : Lying on stretcher, asleep [FreeTextEntry3] : external exam normal [FreeTextEntry4] : loud snoring [de-identified] : external exam normal [FreeTextEntry7] : frequent apneas, scattered rhonchi and referred upper airway sounds otherwise clear lungs [de-identified] : +contractures of extremities [de-identified] : hypertonic extremities, non-verbal, HIE
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
## 2025-07-13 NOTE — PHYSICAL EXAM
[No Crackles] : no crackles [No Wheezing] : no wheezing [No Drainage] : no drainage [No Conjunctivitis] : no conjunctivitis [No Nasal Drainage] : no nasal drainage [Non-Erythematous] : non-erythematous [No Exudates] : no exudates [No Tonsillar Enlargement] : no tonsillar enlargement [No Stridor] : no stridor [Absence Of Retractions] : absence of retractions [Symmetric] : symmetric [Good Expansion] : good expansion [No Acc Muscle Use] : no accessory muscle use [Good aeration to bases] : good aeration to bases [Normal Sinus Rhythm] : normal sinus rhythm [No Heart Murmur] : no heart murmur [Soft, Non-Tender] : soft, non-tender [Non Distended] : was not ~L distended [No Clubbing] : no clubbing [Capillary Refill < 2 secs] : capillary refill less than two seconds [No Cyanosis] : no cyanosis [No Rashes] : no rashes [FreeTextEntry1] : Lying on stretcher, asleep [FreeTextEntry4] : loud snoring [FreeTextEntry3] : external exam normal [de-identified] : external exam normal [FreeTextEntry7] : frequent apneas, scattered rhonchi and referred upper airway sounds otherwise clear lungs [de-identified] : +contractures of extremities [de-identified] : hypertonic extremities, non-verbal, HIE

## 2025-07-13 NOTE — CONSULT LETTER
[Dear  ___] : Dear  [unfilled], [Consult Letter:] : I had the pleasure of evaluating your patient, [unfilled]. [Please see my note below.] : Please see my note below. [Consult Closing:] : Thank you very much for allowing me to participate in the care of this patient.  If you have any questions, please do not hesitate to contact me. [Sincerely,] : Sincerely, [FreeTextEntry3] : Riki Padron MD [facility]

## 2025-07-13 NOTE — ASSESSMENT
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 Currently scheduled for surgery on 8/1. Patient resides at [facility] in [place], [place]. Additional medical history includes HIE, spastic quadriplegia, seizure disorder, hypertension, chronic respiratory failure, dysphagia, GERD, and GT dependence. He is non-verbal and non-ambulatory.  Martin has had a prior sleep study in 2023 notable for severe HUNG in addition to, reportedly, periods of wakeful apnea. He is currently supported with nocturnal BLPAP (settings in above HPI) and PRN supplemental oxygen via nasal cannula during the day. I spoke to Dr. Palacio from [facility] who reports inconsistent periods of wakefulness and that often times, he is more asleep than awake. There have been prior conversations regarding tracheostomy which has been refused by family in the past. Additionally, family has been opposed to elective tracheostomy in the past. Throughout our visit, Martin was largely asleep, supported with a nasal cannula, with continued apneas and periods of gasping for air, very clearly obstructing. During my time with mom, I expressed my concerns that there is a very good chance that the ICU will have difficulties extubating Martin post-operatively and tracheostomy will need to be discussed. Mom was tearful as she feels torn between pursuit of correction of his scoliosis and the possibility for a trach. Tracheostomy aside, I expressed my concerns that Martin is currently under-supported. He likely would benefit from continuous BLPAP support at this time, especially given his inconsistent periods of wakefulness, at which time tracheostomy should be a consideration anyways.  On top of that, Martin is currently recovering from a viral illness (RVP +parainfluenza) and currently on antibiotics given concern for possible bacterial infection (?bronchitis). He is scheduled for a sedated MRI on 7/14. The timing of this procedure should be considered to provide Martin the best chance to recover well.  Will need to discuss further with [facility] team, anesthesia, and orthopedics before providing clearance.   Based on the above assessment, my recommendations are as follows: 1. Continue current BLPAP settings and recommend a nasal interface with chin strap to prevent air leak from his mouth or even a full facemask as he is monitored closely. Should be on BLPAP during all sleeping hours, not supplemental oxygen. 2. When well everyday: albuterol 2 puffs with spacer (or 2.5 mg/3 mL nebulization) followed by 3% sodium chloride via nebulization twice daily along with chest vest and cough assist device 2x daily 3. With respiratory illnesses: albuterol 2 puffs with spacer (or 2.5 mg/3 mL nebulization) followed by 3% sodium chloride via nebulization along with chest vest and cough assist device 3-4x daily, upwards of every 4 hours 4. Take Fluticasone Propionate  mcg/ACT 2 puffs twice daily with spacer 5. Will speak with [facility]'s team regarding optimization ahead of surgery.  Discussed above assessment and management plan. Parent agreed with plan. All queries were answered.

## 2025-07-13 NOTE — PHYSICAL EXAM
[No Crackles] : no crackles [No Wheezing] : no wheezing [No Drainage] : no drainage [No Conjunctivitis] : no conjunctivitis [No Nasal Drainage] : no nasal drainage [Non-Erythematous] : non-erythematous [No Exudates] : no exudates [No Tonsillar Enlargement] : no tonsillar enlargement [No Stridor] : no stridor [Absence Of Retractions] : absence of retractions [Symmetric] : symmetric [Good Expansion] : good expansion [No Acc Muscle Use] : no accessory muscle use [Good aeration to bases] : good aeration to bases [Normal Sinus Rhythm] : normal sinus rhythm [No Heart Murmur] : no heart murmur [Soft, Non-Tender] : soft, non-tender [Non Distended] : was not ~L distended [No Clubbing] : no clubbing [Capillary Refill < 2 secs] : capillary refill less than two seconds [No Cyanosis] : no cyanosis [No Rashes] : no rashes [FreeTextEntry1] : Lying on stretcher, asleep [FreeTextEntry3] : external exam normal [FreeTextEntry4] : loud snoring [de-identified] : external exam normal [FreeTextEntry7] : frequent apneas, scattered rhonchi and referred upper airway sounds otherwise clear lungs [de-identified] : +contractures of extremities [de-identified] : hypertonic extremities, non-verbal, HIE
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
## 2025-07-13 NOTE — CONSULT LETTER
[Dear  ___] : Dear  [unfilled], [Consult Letter:] : I had the pleasure of evaluating your patient, [unfilled]. [Please see my note below.] : Please see my note below. [Consult Closing:] : Thank you very much for allowing me to participate in the care of this patient.  If you have any questions, please do not hesitate to contact me. [Sincerely,] : Sincerely, [FreeTextEntry3] : Riki Padron MD [facility]

## 2025-07-13 NOTE — PHYSICAL EXAM
[No Crackles] : no crackles [No Wheezing] : no wheezing [No Drainage] : no drainage [No Conjunctivitis] : no conjunctivitis [No Nasal Drainage] : no nasal drainage [Non-Erythematous] : non-erythematous [No Exudates] : no exudates [No Tonsillar Enlargement] : no tonsillar enlargement [No Stridor] : no stridor [Absence Of Retractions] : absence of retractions [Symmetric] : symmetric [Good Expansion] : good expansion [No Acc Muscle Use] : no accessory muscle use [Good aeration to bases] : good aeration to bases [Normal Sinus Rhythm] : normal sinus rhythm [No Heart Murmur] : no heart murmur [Soft, Non-Tender] : soft, non-tender [Non Distended] : was not ~L distended [No Clubbing] : no clubbing [Capillary Refill < 2 secs] : capillary refill less than two seconds [No Cyanosis] : no cyanosis [No Rashes] : no rashes [FreeTextEntry1] : Lying on stretcher, asleep [FreeTextEntry4] : loud snoring [FreeTextEntry3] : external exam normal [de-identified] : external exam normal [FreeTextEntry7] : frequent apneas, scattered rhonchi and referred upper airway sounds otherwise clear lungs [de-identified] : +contractures of extremities [de-identified] : hypertonic extremities, non-verbal, HIE

## 2025-07-13 NOTE — ASSESSMENT
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 Currently scheduled for surgery on 8/1. Patient resides at [facility] in [place], [place]. Additional medical history includes HIE, spastic quadriplegia, seizure disorder, hypertension, chronic respiratory failure, dysphagia, GERD, and GT dependence. He is non-verbal and non-ambulatory.  Martin has had a prior sleep study in 2023 notable for severe HUNG in addition to, reportedly, periods of wakeful apnea. He is currently supported with nocturnal BLPAP (settings in above HPI) and PRN supplemental oxygen via nasal cannula during the day. I spoke to Dr. Palacio from [place] who reports inconsistent periods of wakefulness and that often times, he is more asleep than awake. There have been prior conversations regarding tracheostomy which has been refused by family in the past. Additionally, family has been opposed to elective tracheostomy in the past. Throughout our visit, Martin was largely asleep, supported with a nasal cannula, with continued apneas and periods of gasping for air, very clearly obstructing. During my time with mom, I expressed my concerns that there is a very good chance that the ICU will have difficulties extubating Martin post-operatively and tracheostomy will need to be discussed. Mom was tearful as she feels torn between pursuit of correction of his scoliosis and the possibility for a trach. Tracheostomy aside, I expressed my concerns that Martin is currently under-supported. He likely would benefit from continuous BLPAP support at this time, especially given his inconsistent periods of wakefulness, at which time tracheostomy should be a consideration anyways.  On top of that, Martin is currently recovering from a viral illness (RVP +parainfluenza) and currently on antibiotics given concern for possible bacterial infection (?bronchitis). He is scheduled for a sedated MRI on 7/14. The timing of this procedure should be considered to provide Martin the best chance to recover well.  Will need to discuss further with [facility] team, anesthesia, and orthopedics before providing clearance.   Based on the above assessment, my recommendations are as follows: 1. Continue current BLPAP settings and recommend a nasal interface with chin strap to prevent air leak from his mouth or even a full facemask as he is monitored closely. Should be on BLPAP during all sleeping hours, not supplemental oxygen. 2. When well everyday: albuterol 2 puffs with spacer (or 2.5 mg/3 mL nebulization) followed by 3% sodium chloride via nebulization twice daily along with chest vest and cough assist device 2x daily 3. With respiratory illnesses: albuterol 2 puffs with spacer (or 2.5 mg/3 mL nebulization) followed by 3% sodium chloride via nebulization along with chest vest and cough assist device 3-4x daily, upwards of every 4 hours 4. Take Fluticasone Propionate  mcg/ACT 2 puffs twice daily with spacer 5. Will speak with [facility]'s team regarding optimization ahead of surgery.  Discussed above assessment and management plan. Parent agreed with plan. All queries were answered.

## 2025-07-13 NOTE — CONSULT LETTER
[Dear  ___] : Dear  [unfilled], [Consult Letter:] : I had the pleasure of evaluating your patient, [unfilled]. [Please see my note below.] : Please see my note below. [Consult Closing:] : Thank you very much for allowing me to participate in the care of this patient.  If you have any questions, please do not hesitate to contact me. [Sincerely,] : Sincerely, [FreeTextEntry3] : Riki Padron MD [facility] Pul[facility]

## 2025-07-13 NOTE — ASSESSMENT
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 Currently scheduled for surgery on 8/1. Patient resides at [facility] in [place], [place]. Additional medical history includes HIE, spastic quadriplegia, seizure disorder, hypertension, chronic respiratory failure, dysphagia, GERD, and GT dependence. He is non-verbal and non-ambulatory.  Martin has had a prior sleep study in 2023 notable for severe HUNG in addition to, reportedly, periods of wakeful apnea. He is currently supported with nocturnal BLPAP (settings in above HPI) and PRN supplemental oxygen via nasal cannula during the day. I spoke to Dr. Palacio from [facility] [facility] who reports inconsistent periods of wakefulness and that often times, he is more asleep than awake. There have been prior conversations regarding tracheostomy which has been refused by family in the past. Additionally, family has been opposed to elective tracheostomy in the past. Throughout our visit, Martin was largely asleep, supported with a nasal cannula, with continued apneas and periods of gasping for air, very clearly obstructing. During my time with mom, I expressed my concerns that there is a very good chance that the ICU will have difficulties extubating Martin post-operatively and tracheostomy will need to be discussed. Mom was tearful as she feels torn between pursuit of correction of his scoliosis and the possibility for a trach. Tracheostomy aside, I expressed my concerns that Martin is currently under-supported. He likely would benefit from continuous BLPAP support at this time, especially given his inconsistent periods of wakefulness, at which time tracheostomy should be a consideration anyways.  On top of that, Martin is currently recovering from a viral illness (RVP +parainfluenza) and currently on antibiotics given concern for possible bacterial infection (?bronchitis). He is scheduled for a sedated MRI on 7/14. The timing of this procedure should be considered to provide Martin the best chance to recover well.  Will need to discuss further with [facility] team, anesthesia, and orthopedics before providing clearance.   Based on the above assessment, my recommendations are as follows: 1. Continue current BLPAP settings and recommend a nasal interface with chin strap to prevent air leak from his mouth or even a full facemask as he is monitored closely. Should be on BLPAP during all sleeping hours, not supplemental oxygen. 2. When well everyday: albuterol 2 puffs with spacer (or 2.5 mg/3 mL nebulization) followed by 3% sodium chloride via nebulization twice daily along with chest vest and cough assist device 2x daily 3. With respiratory illnesses: albuterol 2 puffs with spacer (or 2.5 mg/3 mL nebulization) followed by 3% sodium chloride via nebulization along with chest vest and cough assist device 3-4x daily, upwards of every 4 hours 4. Take Fluticasone Propionate  mcg/ACT 2 puffs twice daily with spacer 5. Will speak with [facility]'s team regarding optimization ahead of surgery.  Discussed above assessment and management plan. Parent agreed with plan. All queries were answered.

## 2025-07-13 NOTE — REVIEW OF SYSTEMS
[NI] : Allergic [Nl] : Hematologic/Lymphatic [Seizure] : seizures [Developmental Delay] : developmental delay [Failure To Thrive] : failure to thrive [FreeTextEntry2] : see HPI [FreeTextEntry4] : see HPI [FreeTextEntry7] : see HPI [FreeTextEntry6] : see HPI [FreeTextEntry8] : see HPI [FreeTextEntry9] : see HPI

## 2025-07-13 NOTE — REASON FOR VISIT
[Initial Consultation] : an initial consultation for [Mother] : mother [Other: _____] : [unfilled] [Language Line ] : provided by Language Line   [FreeTextEntry3] : Mandarin [TWNoteComboBox1] : Other

## 2025-07-13 NOTE — ASSESSMENT
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 Currently scheduled for surgery on 8/1. Patient resides at [facility] in [place], [place]. Additional medical history includes HIE, spastic quadriplegia, seizure disorder, hypertension, chronic respiratory failure, dysphagia, GERD, and GT dependence. He is non-verbal and non-ambulatory.  Martin has had a prior sleep study in 2023 notable for severe HUNG in addition to, reportedly, periods of wakeful apnea. He is currently supported with nocturnal BLPAP (settings in above HPI) and PRN supplemental oxygen via nasal cannula during the day. I spoke to Dr. Palacio from [place] who reports inconsistent periods of wakefulness and that often times, he is more asleep than awake. There have been prior conversations regarding tracheostomy which has been refused by family in the past. Additionally, family has been opposed to elective tracheostomy in the past. Throughout our visit, Martin was largely asleep, supported with a nasal cannula, with continued apneas and periods of gasping for air, very clearly obstructing. During my time with mom, I expressed my concerns that there is a very good chance that the ICU will have difficulties extubating Martin post-operatively and tracheostomy will need to be discussed. Mom was tearful as she feels torn between pursuit of correction of his scoliosis and the possibility for a trach. Tracheostomy aside, I expressed my concerns that Martin is currently under-supported. He likely would benefit from continuous BLPAP support at this time, especially given his inconsistent periods of wakefulness, at which time tracheostomy should be a consideration anyways.  On top of that, Martin is currently recovering from a viral illness (RVP +parainfluenza) and currently on antibiotics given concern for possible bacterial infection (?bronchitis). He is scheduled for a sedated MRI on 7/14. The timing of this procedure should be considered to provide Martin the best chance to recover well.  Will need to discuss further with [facility] team, anesthesia, and orthopedics before providing clearance.   Based on the above assessment, my recommendations are as follows: 1. Continue current BLPAP settings and recommend a nasal interface with chin strap to prevent air leak from his mouth or even a full facemask as he is monitored closely. Should be on BLPAP during all sleeping hours, not supplemental oxygen. 2. When well everyday: albuterol 2 puffs with spacer (or 2.5 mg/3 mL nebulization) followed by 3% sodium chloride via nebulization twice daily along with chest vest and cough assist device 2x daily 3. With respiratory illnesses: albuterol 2 puffs with spacer (or 2.5 mg/3 mL nebulization) followed by 3% sodium chloride via nebulization along with chest vest and cough assist device 3-4x daily, upwards of every 4 hours 4. Take Fluticasone Propionate  mcg/ACT 2 puffs twice daily with spacer 5. Will speak with [facility]'s team regarding optimization ahead of surgery.  Discussed above assessment and management plan. Parent agreed with plan. All queries were answered.
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
## 2025-07-13 NOTE — CONSULT LETTER
[Dear  ___] : Dear  [unfilled], [Consult Letter:] : I had the pleasure of evaluating your patient, [unfilled]. [Please see my note below.] : Please see my note below. [Consult Closing:] : Thank you very much for allowing me to participate in the care of this patient.  If you have any questions, please do not hesitate to contact me. [Sincerely,] : Sincerely, [FreeTextEntry3] : Riki Padron MD [facility] Pulmonary [facility]rosis [facility]

## 2025-07-13 NOTE — ASSESSMENT
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 Currently scheduled for surgery on 8/1. Patient resides at [facility] in [place], [place]. Additional medical history includes HIE, spastic quadriplegia, seizure disorder, hypertension, chronic respiratory failure, dysphagia, GERD, and GT dependence. He is non-verbal and non-ambulatory.  Martin has had a prior sleep study in 2023 notable for severe HUNG in addition to, reportedly, periods of wakeful apnea. He is currently supported with nocturnal BLPAP (settings in above HPI) and PRN supplemental oxygen via nasal cannula during the day. I spoke to Dr. Palacio from [place] who reports inconsistent periods of wakefulness and that often times, he is more asleep than awake. There have been prior conversations regarding tracheostomy which has been refused by family in the past. Additionally, family has been opposed to elective tracheostomy in the past. Throughout our visit, Martin was largely asleep, supported with a nasal cannula, with continued apneas and periods of gasping for air, very clearly obstructing. During my time with mom, I expressed my concerns that there is a very good chance that the ICU will have difficulties extubating Martin post-operatively and tracheostomy will need to be discussed. Mom was tearful as she feels torn between pursuit of correction of his scoliosis and the possibility for a trach. Tracheostomy aside, I expressed my concerns that Martin is currently under-supported. He likely would benefit from continuous BLPAP support at this time, especially given his inconsistent periods of wakefulness, at which time tracheostomy should be a consideration anyways.  On top of that, Martin is currently recovering from a viral illness (RVP +parainfluenza) and currently on antibiotics given concern for possible bacterial infection (?bronchitis). He is scheduled for a sedated MRI on 7/14. The timing of this procedure should be considered to provide Martin the best chance to recover well.  Will need to discuss further with [facility] team, anesthesia, and orthopedics before providing clearance.   Based on the above assessment, my recommendations are as follows: 1. Continue current BLPAP settings and recommend a nasal interface with chin strap to prevent air leak from his mouth or even a full facemask as he is monitored closely. Should be on BLPAP during all sleeping hours, not supplemental oxygen. 2. When well everyday: albuterol 2 puffs with spacer (or 2.5 mg/3 mL nebulization) followed by 3% sodium chloride via nebulization twice daily along with chest vest and cough assist device 2x daily 3. With respiratory illnesses: albuterol 2 puffs with spacer (or 2.5 mg/3 mL nebulization) followed by 3% sodium chloride via nebulization along with chest vest and cough assist device 3-4x daily, upwards of every 4 hours 4. Take Fluticasone Propionate  mcg/ACT 2 puffs twice daily with spacer 5. Will speak with [facility]'s team regarding optimization ahead of surgery.  Discussed above assessment and management plan. Parent agreed with plan. All queries were answered.

## 2025-07-13 NOTE — REVIEW OF SYSTEMS
[NI] : Allergic [Nl] : Hematologic/Lymphatic [Seizure] : seizures [Developmental Delay] : developmental delay [Failure To Thrive] : failure to thrive [FreeTextEntry2] : see HPI [FreeTextEntry4] : see HPI [FreeTextEntry6] : see HPI [FreeTextEntry7] : see HPI [FreeTextEntry8] : see HPI [FreeTextEntry9] : see HPI
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
## 2025-07-21 NOTE — REASON FOR VISIT
[Initial Consultation] : an initial consultation for [Presurgical Evaluation] : presurgical evaluation [Mother] : mother [Language Line ] : provided by Language Line   [Interpreters_IDNumber] : [id number] [Interpreters_FullName] : Selena [FreeTextEntry3] : Mandarin [TWNoteComboBox1] : Other

## 2025-07-21 NOTE — CARDIOLOGY SUMMARY
[Today's Date] : [unfilled] [FreeTextEntry1] : Normal sinus rhythm, HR 66bpm, normal axes and intervals, normal ECG [FreeTextEntry2] : Summary: 1. Technically limited study due to poor acoustic windows, patient positioning, and lung artifact. 2. Normal left ventricular size, morphology and systolic function. 3. Normal right ventricular morphology with qualitatively normal size and systolic function. 4. The tricuspid regurgitant jet, as recorded, is inadequate for the purpose of estimating right ventricular systolic pressure. 5. No pericardial effusion.

## 2025-07-21 NOTE — CLEARANCE
[Cleared] : ~He/She~ is cleared for the surgical procedure [Does not require] : ~He/She~ does not require SBE prophylaxis [Requires] : ~He/She~ will require cardiac monitoring throughout the procedure and during the recovery period as per routine protocol [Does not require] : ~He/She~ does not require anesthesia by nor in consultation with a Pediatric Cardiac Anesthesiologist

## 2025-07-21 NOTE — PHYSICAL EXAM
[General Appearance - Well Nourished] : well nourished [Sclera] : the conjunctiva were normal [Outer Ear] : the ears and nose were normal in appearance [Examination Of The Oral Cavity] : mucous membranes were moist and pink [Respiration, Rhythm And Depth] : normal respiratory rhythm and effort [Clear Bilaterally] : the lungs were clear to auscultation bilaterally [Rales / Crackles Bilateral] : no rales or crackles were heard [Normal Chest Appearance] : the chest was normal in appearance [Chest Visual Inspection Thoracic Deformity] : no chest wall deformity [Heart Rate And Rhythm] : normal heart rate and rhythm [Apical Impulse] : quiet precordium with normal apical impulse [Heart Sounds] : normal S1 and S2 [No Murmur] : no murmurs  [Heart Sounds Gallop] : no gallops [Heart Sounds Pericardial Friction Rub] : no pericardial rub [Edema] : no edema [Arterial Pulses] : normal upper and lower extremity pulses with no pulse delay [Bowel Sounds] : normal bowel sounds [Abdomen Soft] : soft [] : no hepato-splenomegaly [Nail Clubbing] : no clubbing  or cyanosis of the fingers [FreeTextEntry1] : lying in stretcher, sleeping [FreeTextEntry5] : cough intermittently

## 2025-07-21 NOTE — CONSULT LETTER
[Today's Date] : [unfilled] [Name] : Name: [unfilled] [] : : ~~ [Today's Date:] : [unfilled] [Dear  ___:] : Dear Dr. [unfilled]: [Consult - Single Provider] : Thank you very much for allowing me to participate in the care of this patient. If you have any questions, please do not hesitate to contact me. [Sincerely,] : Sincerely, [FreeTextEntry4] : Pillo Palacio MD [FreeTextEntry5] : [address] [place] [id number] [de-identified] : Yahaira Navarro MD, FRCPC, FAAP Pediatric Cardiologist [facility] [email or web address]

## 2025-07-22 NOTE — ASSESSMENT
[FreeTextEntry1] : Martin is a 17-year-old male with spastic cerebral palsy, epilepsy, and contractures presenting as a follow up for spasticity management. During the prior visit, tried to defer Botox injections until after his spinal surgery in August, but the mother can communicate with Martin and spasticity driven pain is significant, and the mother does not want to delay further care. Today he underwent alcohol neurolysis and ultrasound-guided botulinum toxin injections. Mother and rehab RN caregiver were present for the visit. His overall medical complexity necessitates careful consideration of any additional procedures or interventions prior to the spinal surgery.  PLAN: - Will email [facility] NP to set up video visit to monitor symptoms - Will monitor this patient post-operatively after spine surgery.   attending addendum defered chemodenervation to hamstring and left hip flexor and left hip obturator

## 2025-07-22 NOTE — REVIEW OF SYSTEMS
[Joint Stiffness] : joint stiffness [Fever] : no fever [Red Eyes] : eyes not red [Chest Pain] : no chest pain [Swollen Glands] : no swollen glands [FreeTextEntry6] : On supplemental O2 via NC [FreeTextEntry7] : G tube in place.  [FreeTextEntry8] : Wearing diaper [de-identified] : No rashes visualized. [de-identified] : Positive for spastic quadriplegia

## 2025-07-22 NOTE — REVIEW OF SYSTEMS
[Joint Stiffness] : joint stiffness [Fever] : no fever [Red Eyes] : eyes not red [Chest Pain] : no chest pain [Swollen Glands] : no swollen glands [FreeTextEntry6] : On supplemental O2 via NC [FreeTextEntry7] : G tube in place.  [FreeTextEntry8] : Wearing diaper [de-identified] : No rashes visualized. [de-identified] : Positive for spastic quadriplegia

## 2025-07-22 NOTE — PROCEDURE
[Total Units: ___] : [unfilled] units [Consent] : consent was given by patient or guardian [Site Verification] : the injection site was verified [Post-Injection Instructions Provided] : post-injection instructions were provided [Other ___] : [unfilled] [Total Vials: ___] : [unfilled] vials were used [Total Waste: ___] : with [unfilled] wasted [] : Nerve stimulation was used during the procedure [de-identified] : Alcohol neurolysis was performed.  Description: 4 cc's of normal saline and 5 cc's of 95% abalysinol were utilized. per 9ml syringe solution and I used total 9ml 3 syringes total. Used 9 mL per each nerve level: - R Musculocutaneous nerve total 9ml - L Musculocutaneous nerve total 9ml - R Obturator Nerve (divided into 7 mL Anterior branch of obturator and 2 mL posterior branch).   I used ultrasound and estim and I injected the until physiological twitch dissipated and  Additionally, performed botulinum toxin injections to: [de-identified] : Pectoralis Major [de-identified] : 50 Units [TWNoteComboBox1] : Flexor Carpi Radialis [TWNoteComboBox2] : 75 Units [de-identified] : Flexor Carpi Ulnaris [de-identified] : 25 Units [de-identified] : Flexor Digitorum Superficialis [de-identified] : 50 Units [de-identified] : Flexor Carpi Radialis [de-identified] : 50 Units [de-identified] : Flexor Carpi Ulnaris [de-identified] : 50 Units [de-identified] : Flexor Digitorum Superficialis [de-identified] : 50 Units [de-identified] : Latissimus Dorsi [de-identified] : 50 Units [de-identified] : Flexor Digitorum Profundus [de-identified] : 25 Units [de-identified] : 25 Units [de-identified] : 25 Units

## 2025-07-22 NOTE — PHYSICAL EXAM
[FreeTextEntry1] : HYSICAL EXAMINATION: General appearance - dystonic posture. Appears comfortable at rest, although intermittently crying during injections. Consolable.  Mental status - Non verbal, does not follow commands at baseline due to spasticity Respiratory - no wheezing heard CHEST: equal expansion upon breathing in Abdomen - G tube in place Skin - Warm and dry.  Neurological - Baseline, non-verbal, spastic quadriplegia  Coordination & Balance: no sitting balance  Musculoskeletal - right hip internally rotated and can palpate Greater trochanter on Right left knee can be extended fully Right knee can be extended upto 30 degree knee angle 30 degree Left elbow can be extended upto 130 Right elbow can be extended upto 130 spine curve significant.

## 2025-07-22 NOTE — PROCEDURE
[Total Units: ___] : [unfilled] units [Consent] : consent was given by patient or guardian [Site Verification] : the injection site was verified [Post-Injection Instructions Provided] : post-injection instructions were provided [Other ___] : [unfilled] [Total Vials: ___] : [unfilled] vials were used [Total Waste: ___] : with [unfilled] wasted [] : Nerve stimulation was used during the procedure [de-identified] : Alcohol neurolysis was performed.  Description: 4 cc's of normal saline and 5 cc's of 95% abalysinol were utilized. per 9ml syringe solution and I used total 9ml 3 syringes total. Used 9 mL per each nerve level: - R Musculocutaneous nerve total 9ml - L Musculocutaneous nerve total 9ml - R Obturator Nerve (divided into 7 mL Anterior branch of obturator and 2 mL posterior branch).   I used ultrasound and estim and I injected the until physiological twitch dissipated and  Additionally, performed botulinum toxin injections to: [de-identified] : Pectoralis Major [de-identified] : 50 Units [TWNoteComboBox1] : Flexor Carpi Radialis [TWNoteComboBox2] : 75 Units [de-identified] : Flexor Carpi Ulnaris [de-identified] : 25 Units [de-identified] : Flexor Digitorum Superficialis [de-identified] : 50 Units [de-identified] : Flexor Carpi Radialis [de-identified] : 50 Units [de-identified] : Flexor Carpi Ulnaris [de-identified] : 50 Units [de-identified] : Flexor Digitorum Superficialis [de-identified] : 50 Units [de-identified] : Latissimus Dorsi [de-identified] : 50 Units [de-identified] : Flexor Digitorum Profundus [de-identified] : 25 Units [de-identified] : 25 Units [de-identified] : 25 Units

## 2025-07-22 NOTE — HISTORY OF PRESENT ILLNESS
[FreeTextEntry1] :   This note was created using Dragon Voice Recognition Software and reviewed to the best of my ability. Sporadic inaccurate translation may have occurred. Please forgive any typographical or grammatical errors, and please contact me to clarify discrepancies or to verify content.  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 He is scheduled for spinal surgery on August 1st. Current medications include propranolol, Keppra, glycopyrrolate, dantrolene, and diazepam. He receives G-tube feedings and supplemental oxygen. He is experiencing increased muscle tightness, particularly in his spine, hips, and legs. He has a history of Botox injections for spasticity management, performed by Dr. Feldman, but his mother reports diminishing returns and increasing tightness despite these treatments. He has an elbow contracture on the right side (125 degrees of extension), right hip dislocation, and stiff right elbow. His right knee can be fully extended, while the left knee extends 30 degrees beyond full extension.   so overall becoming opsoclonus position with tightening upper extremities  Puring the prior visit, tried to defer Botox injections until after his spinal surgery in August, but the mother can communicate with Inocencio and spasticity driven pain is significant, and the mother does not want to delay further care. Today we are performing alcohol neurolysis and ultrasound-guided botulinum toxin injections.  i had detailed discussions before chemodenervation The mother is worried about right tight shoulder bilatearl elbow tightness and wrist flexion contractures  SInce alcohol neurolysis takes effects immediately I allowed the mother to check how much she was satisfied with the alcohol chemoneurolysis Current Functional Status: Dependent in all ADLs  Current Functional Status: dependent in ADL             
[FreeTextEntry1] :   This note was created using Dragon Voice Recognition Software and reviewed to the best of my ability. Sporadic inaccurate translation may have occurred. Please forgive any typographical or grammatical errors, and please contact me to clarify discrepancies or to verify content.  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 He is scheduled for spinal surgery on August 1st. Current medications include propranolol, Keppra, glycopyrrolate, dantrolene, and diazepam. He receives G-tube feedings and supplemental oxygen. He is experiencing increased muscle tightness, particularly in his spine, hips, and legs. He has a history of Botox injections for spasticity management, performed by Dr. Feldman, but his mother reports diminishing returns and increasing tightness despite these treatments. He has an elbow contracture on the right side (125 degrees of extension), right hip dislocation, and stiff right elbow. His right knee can be fully extended, while the left knee extends 30 degrees beyond full extension.   so overall becoming opsoclonus position with tightening upper extremities  Puring the prior visit, tried to defer Botox injections until after his spinal surgery in August, but the mother can communicate with Inocencio and spasticity driven pain is significant, and the mother does not want to delay further care. Today we are performing alcohol neurolysis and ultrasound-guided botulinum toxin injections.  i had detailed discussions before chemodenervation The mother is worried about right tight shoulder bilatearl elbow tightness and wrist flexion contractures  SInce alcohol neurolysis takes effects immediately I allowed the mother to check how much she was satisfied with the alcohol chemoneurolysis Current Functional Status: Dependent in all ADLs  Current Functional Status: dependent in ADL             
normal...

## 2025-07-24 NOTE — DATA REVIEWED
[de-identified] : 7/24/25:  Preoperative bending XRs Scoliosis X-ray performed today independently reviewed demonstrates 93 degree left thorolumbar curve. No hemivertebrae or congenital deformity noted. The disc spaces are equal throughout spine. R hip is subluxed. Risser is difficult to assess based on todays x-rays

## 2025-07-24 NOTE — PHYSICAL EXAM
[facility]    [address]    [place] [address]    Phone:  [phone]    Fax:  [phone]       Thank You for choosing us for your health care visit. We are glad to serve you and happy to provide you with this summary of your visit. Please help us to ensure we have accurate records. If you find anything that needs to be changed, please let our staff know as soon as possible.          Your Demographic Information     Patient Name Sex Piper Quezada Female 1990       Ethnic Group Patient Race    Not of  or  Origin White      Your Visit Details     Date & Time Provider Department    4/3/2017 11:30 AM BELEN Fuller [facility]      Your Vitals Were     BP Pulse Height Weight BMI Smoking Status    96/60 100 5' 9.5\" (1.765 m) 254 lb (115.2 kg) 36.97 kg/m2 Current Every Day Smoker      Medications Prescribed or Re-Ordered Today     None      Your Current Medications Are        Disp Refills Start End    Multiple Vitamins-Minerals (MULTIVITAMIN ADULT PO)        Class: Historical Med    Route: Oral      Discontinued Medications        Reason for Discontinue    azithromycin (ZITHROMAX) 250 MG tablet Therapy Completed      Allergies     Ibuprofen VOMITING      Immunizations History as of 4/3/2017     Name Date    DTaP 1996, 3/3/1992, 1991, 1991, 1991    HIB 3/3/1992, 1991, 1991    HPV QUAD 5/15/2008, 2008, 2007    Hepatitis B Child 2001, 2001, 3/16/1999, 1998, 1996    MMR 1996, 3/3/1992    Polio, ORAL 1996, 3/3/1992, 1991, 1991    Tdap 2007      Problem List as of 4/3/2017     Tobacco use disorder              Patient Instructions      Thank you for allowing us to care for you today!     I hope that you have had your questions answered and you understand any plans that have been discussed. If you did not, or if you have more  questions, please call us or send us a message via tinyclues at your convenience. Our phone numbers are listed below and directions on how to sign up for tinyclues are also listed below.     Please consider signing up for tinyclues. Please visit [email or web address] to sign up, it takes less than five minutes. This will allow you to set up a free online resource for quick and easy access to personal health information that allows you to view your own labs, view image results, send messages to nursing as well as myself, request medication refills, schedule appointments, pay bills and more.    Mailed Survey:  Shortly after your visit, you may receive a survey in the mail. We kindly ask that you take the time to fill it out. These surveys are a great way to help us improve and our clinic is graded on these results! We appreciate your feedback and strive to provide you with the best care possible.     Preventative Health Care:   Your health is very important to us and we high recommend preventive health care services. Preventative health care can include lab tests, imaging, and vaccines. Please review your recommendations below and contact us with further questions, concerns, and for assistance scheduling these items.     Health Maintenance Summary     Topic Due On Due Status Completed On    IMMUNIZATION - HPV  Completed May 15, 2008    PAP SMEAR - CERVICAL CANCER SCREENING Aug 1, 2019 Not Due Aug 1, 2016    Immunization - TDAP Pregnancy  Hidden     IMMUNIZATION - DTaP/Tdap/Td Nov 14, 2017 Not Due Nov 14, 2007    Immunization-Influenza Sep 1, 2017 Not Due               Thank you,    BELEN Torres  [facility]  Office:  [phone]  Nurse:  [phone]  Fax: [phone]                        [FreeTextEntry1] : Gait: Non ambulatory, presents in stretcher  GENERAL: appears in no apparent distress  SKIN: The skin is intact, warm, pink and dry over the area examined. CARDIOVASCULAR: brisk capillary refill, but no peripheral edema. RESPIRATORY: The patient is in no apparent respiratory distress. They're taking full deep breaths without use of accessory muscles or evidence of audible wheezes or stridor without the use of a stethoscope. Normal respiratory effort. ABDOMEN: not examined MSK: Significant joint contractures of the upper and lower extremities, patient is windswept to the left. No apparent discomfort with gentle ROM of the hips  Spine Significant left thoracolumbar curve  Motor and sensory exam limited due to underlying diagnosis

## 2025-07-24 NOTE — HISTORY OF PRESENT ILLNESS
[FreeTextEntry1] : Martin is a 17-year-old male who is brought in today by mother and nurse from [place], where he resides, for follow up regarding scoliosis and right hip. 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 Patient presents today in a stretcher. Initially seen 02/03/2025, surgery was briefly discussed for 90-degree scoliosis. He is now scheduled for posterior spinal fusion with insturmentation on 8/1/2025. He has been evaluated by cardiology, pulmonology, and has an appointment with neurology tomorrow for clearance for surgery. He obtained medical clearance for surgery through [facility] medial team. Per nurse, patient is requiring 2 L of oxygen as a result of his difficult breathing difficulties. Mother reports child has both breathing and sleeping difficulties. Mother feels with certain positions his back appears to cause his discomfort. Mother does not feel his hip appears to cause him any discomfort.  Nurse from [place] denies that he appears to have any pain or discomfort. Mother also reports child is having constipation issues. Mother is hesitant to undergo any Botox injections at this time. Patient last received Botox injection in January 2025. Here today to discuss surgery further.   JIA Herrera assisted with Mandarin interpretation.

## 2025-07-24 NOTE — HISTORY OF PRESENT ILLNESS
[FreeTextEntry1] : Martin is a 17-year-old male who is brought in today by mother and nurse from [place], where he resides, for follow up regarding scoliosis and right hip. 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 Patient presents today in a stretcher. Initially seen 02/03/2025, surgery was briefly discussed for 90-degree scoliosis. He is now scheduled for posterior spinal fusion with insturmentation on 8/1/2025. He has been evaluated by cardiology, pulmonology, and has an appointment with neurology tomorrow for clearance for surgery. He obtained medical clearance for surgery through [place] medial team. Per nurse, patient is requiring 2 L of oxygen as a result of his difficult breathing difficulties. Mother reports child has both breathing and sleeping difficulties. Mother feels with certain positions his back appears to cause his discomfort. Mother does not feel his hip appears to cause him any discomfort.  Nurse from [place] denies that he appears to have any pain or discomfort. Mother also reports child is having constipation issues. Mother is hesitant to undergo any Botox injections at this time. Patient last received Botox injection in January 2025. Here today to discuss surgery further.   JIA Herrera assisted with Mandarin interpretation.

## 2025-07-24 NOTE — ASSESSMENT
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 Cody Herrera assisted with Mandarin interpretation for the entirety of the visit. Natural history of spine deformity discussed. Clinical findings and imaging was discussed with the patient and family at length. X-rays performed and reviewed in office today revealing a 93-degree curve. It was discussed that for scoliosis, curves less than 25 degrees are usually managed with observation. Bracing is warranted for curves measuring greater than 25 degrees with skeletal growth remaining.  Braces do not correct curves permanently and there is a 30% risk brace failure. Surgery is recommended for scoliosis measuring greater than 45 degrees. Today curve measures 93 degrees. Surgery was discussed with [facility] nurse and mother. He is scheduled for posterior spinal fusion with instrumentation on 8/1/25. He has been cleared by pulmonology and cardiology, pending neurology clearance tomorrow.   Surgery will entail spine fusion with instrumentation, osteotomies, cell saver, multimodality spinal cord monitoring, bone grafting (autograft / allograft), Thoracoplasty, navigated guidance vs fluoroscopic guidance, and complex wound closure is planned. Perioperative plan discussed. X-rays of patients operated on by me in the past were shown. All risks and complications including, but not limited to, infection, nonunion, implant failure, resurgery, less than full correction, paralysis explained. Wake up test discussed. Transfusion risk discussed. Neuromonitoring explained. Rib resection possibility discussed. Use of fluoroscopy and AIRO navigation explained Infection prevention steps discussed. Post-op pain management protocol discussed. Intraspinal duramorph discussed. We also discussed that given multiple comorbidities there is a risk of pulmonary and GI complications post operatively which mother understands. Mandarin Consents signed in ofifce today.  All questions answered. Benefits and alternatives explained.   We spent 30 minutes on HPI, Clinical exam, ordering/ reviewing all imaging, reviewing any existing record, reviewing findings and counseling patient to treatment, differentials,etiology, prognosis, natural history, implications on ADLs, activities limitations/modifications, genetics, answering questions and addressing concerns, treatment goals and documenting in the EHR.

## 2025-07-24 NOTE — ASSESSMENT
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 Cody Herrera assisted with Mandarin interpretation for the entirety of the visit. Natural history of spine deformity discussed. Clinical findings and imaging was discussed with the patient and family at length. X-rays performed and reviewed in office today revealing a 93-degree curve. It was discussed that for scoliosis, curves less than 25 degrees are usually managed with observation. Bracing is warranted for curves measuring greater than 25 degrees with skeletal growth remaining.  Braces do not correct curves permanently and there is a 30% risk brace failure. Surgery is recommended for scoliosis measuring greater than 45 degrees. Today curve measures 93 degrees. Surgery was discussed with [facility] nurse and mother. He is scheduled for posterior spinal fusion with instrumentation on 8/1/25. He has been cleared by pulmonology and cardiology, pending neurology clearance tomorrow.   Surgery will entail spine fusion with instrumentation, osteotomies, cell saver, multimodality spinal cord monitoring, bone grafting (autograft / allograft), Thoracoplasty, navigated guidance vs fluoroscopic guidance, and complex wound closure is planned. Perioperative plan discussed. X-rays of patients operated on by me in the past were shown. All risks and complications including, but not limited to, infection, nonunion, implant failure, resurgery, less than full correction, paralysis explained. Wake up test discussed. Transfusion risk discussed. Neuromonitoring explained. Rib resection possibility discussed. Use of fluoroscopy and AIRO navigation explained Infection prevention steps discussed. Post-op pain management protocol discussed. Intraspinal duramorph discussed. We also discussed that given multiple comorbidities there is a risk of pulmonary and GI complications post operatively which mother understands. Mandarin Consents signed in ofifce today.  All questions answered. Benefits and alternatives explained.   We spent 30 minutes on HPI, Clinical exam, ordering/ reviewing all imaging, reviewing any existing record, reviewing findings and counseling patient to treatment, differentials,etiology, prognosis, natural history, implications on ADLs, activities limitations/modifications, genetics, answering questions and addressing concerns, treatment goals and documenting in the EHR.

## 2025-07-24 NOTE — DATA REVIEWED
[de-identified] : 7/24/25:  Preoperative bending XRs Scoliosis X-ray performed today independently reviewed demonstrates 93 degree left thorolumbar curve. No hemivertebrae or congenital deformity noted. The disc spaces are equal throughout spine. R hip is subluxed. Risser is difficult to assess based on todays x-rays

## 2025-07-25 NOTE — CONSULT LETTER
[Dear  ___] : Dear  [unfilled], [Consult Letter:] : I had the pleasure of evaluating your patient, [unfilled]. [Please see my note below.] : Please see my note below. [Consult Closing:] : Thank you very much for allowing me to participate in the care of this patient.  If you have any questions, please do not hesitate to contact me. [Sincerely,] : Sincerely, [FreeTextEntry3] : Tessa Caballero MD Child Neurologist [address], [address] [place] [address] Phone: [phone]

## 2025-07-25 NOTE — PHYSICAL EXAM
[Well-appearing] : well-appearing [No ocular abnormalities] : no ocular abnormalities [Alert] : alert [de-identified] : nonlabored breathing [de-identified] : significant spinal curvature [de-identified] : nonverbal [de-identified] : severe contractures, spasticity x4 [de-identified] : unable to assess [de-identified] : nonambulatory

## 2025-07-25 NOTE — REVIEW OF SYSTEMS
[Normal] : Cardiovascular [FreeTextEntry6] : see hpi [FreeTextEntry7] : g tube [de-identified] : spasticity, scoliosis [FreeTextEntry8] : see hpi

## 2025-07-25 NOTE — PLAN
Spoke with patient via video , informed patient of portal message Dr. Wilks sent on 8/30.  Questions and concerns addressed.  Patient verbalized understanding. 
Were you trying to contact the patient?
[FreeTextEntry1] : Will increase LEV to 750 mg BID prior to his surgery and in setting of recent reported seizure Requested records from his prior neurologist (MRI, EEG) [facility] epilepsy panel (saliva) obtained today Neurologically cleared for his spinal surgery; will try to coordinate obtaining EEG in the hospital if possible F/u PM&R F/u 3 months

## 2025-07-25 NOTE — ASSESSMENT
[FreeTextEntry1] : 18 yo boy with cerebral palsy, seizure disorder secondary to an anoxic event at 8 years old here to establish care.

## 2025-07-25 NOTE — REASON FOR VISIT
[Initial Consultation] : an initial consultation for [[facility] ] : provided by [facility]   [Time Spent: ____ minutes] : Total time spent using  services: [unfilled] minutes. The patient's primary language is not English thus required  services. [Cerebral Palsy] : cerebral palsy [Seizure Disorder] : seizure disorder [Mother] : mother [Medical Records] : medical records [Interpreters_IDNumber] : [id number] [TWNoteComboBox1] : Chinese

## 2025-07-25 NOTE — HISTORY OF PRESENT ILLNESS
[FreeTextEntry1] : MARTIN MAO is a 17 year old boy with history of anoxic brain injury, cerebral palsy, seizure disorder who presents for neurological evaluation.  Seen with his mother and staff member from [facility], where he resides.  He was referred for neurological clearance due to his upcoming spinal surgery for scoliosis.  Mom states that in July 2016, he was taking a shower by himself.  He had an unwitnessed fall and was found unconscious in the bathtub.  He was taken to [facility] where he was hospitalized in the ICU for one month.  He had seizures during that time and was diagnosed with HIE.  Etiology of the incident was unclear, and he reportedly had genetic testing, which was negative.  Mom does not know the specific testing that was done.  Since then, he has been maintained on LEV.  No side effects from medication. Mom is unclear about his seizure history, but nurse at [facility] denies any witnessed seizures for >1 year since he has been there.  However, I was told by his PM&R doctor that Martin did have a seizure during transport to his last appointment there in June, details are unclear.  He is nonverbal, nonambulatory, and has a g-tube.  He has a severe spastic quadriparetic CP and scoliosis, followed by orthopedics and PM&R.  Last Botox 4/2024.  Parents declined baclofen pump. Per notes, he has  hx of HUNG and chronic respiratory failure and is on nasal cannula during day and Bipap at night, Previous neurologist at [facility], no prior records available. No recent EEGs. Labs at [facility]- per report LEV level was 13.  Current Medications: Glycopyrrolate 1 mg BID  mg BID Miralax 17 g BID Omeprazole 20 mg BID Propranolol 40 mg BID Senna 8.6 mg once daily prn 3% NaCl neb BID Tums 500 mg once a day Vitamin D 400 iU daily Albuterol PRN Flonase 1 puff BID Senna prn Clonidine 0.2 mg monday Dantrolene 50 mg q6 Diazepam 5 mg TID Flovent  BID  Birth history: Born at 41 weeks gestation, unremarkable pregnancy and delivery.  Normal developmental milestones.  FHx: No family history of epilepsy or unexplained death, arrest